# Patient Record
Sex: FEMALE | Race: WHITE | NOT HISPANIC OR LATINO
[De-identification: names, ages, dates, MRNs, and addresses within clinical notes are randomized per-mention and may not be internally consistent; named-entity substitution may affect disease eponyms.]

---
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
## 2024-03-28 ENCOUNTER — APPOINTMENT (RX ONLY)
Dept: URBAN - METROPOLITAN AREA CLINIC 151 | Facility: CLINIC | Age: 19
Setting detail: DERMATOLOGY
End: 2024-03-28

## 2024-03-28 ENCOUNTER — RX ONLY (OUTPATIENT)
Age: 19
Setting detail: RX ONLY
End: 2024-03-28

## 2024-03-28 DIAGNOSIS — L70.0 ACNE VULGARIS: ICD-10-CM

## 2024-03-28 PROCEDURE — ? MEDICATION COUNSELING

## 2024-03-28 PROCEDURE — 99214 OFFICE O/P EST MOD 30 MIN: CPT

## 2024-03-28 PROCEDURE — ? DIAGNOSIS COMMENT

## 2024-03-28 PROCEDURE — ? COUNSELING

## 2024-03-28 PROCEDURE — ? PRESCRIPTION

## 2024-03-28 PROCEDURE — ? PRESCRIPTION MEDICATION MANAGEMENT

## 2024-03-28 RX ORDER — SPIRONOLACTONE 100 MG/1
2 TABLETS TABLET, FILM COATED ORAL DAILY
Qty: 120 | Refills: 3 | Status: ERX

## 2024-03-28 RX ORDER — ADAPALENE 3 MG/G
GEL TOPICAL DAILY
Qty: 45 | Refills: 6 | Status: ERX | COMMUNITY
Start: 2024-03-28

## 2024-03-28 RX ADMIN — ADAPALENE: 3 GEL TOPICAL at 00:00

## 2024-03-28 NOTE — PROCEDURE: DIAGNOSIS COMMENT
Comment: Much improved, mild to moderate commedonal acne on spironolactone 200mg x3 months and no reported side effects. Rec using adapalene 0.3% for comedones around mouth. FU 6 months.
Render Risk Assessment In Note?: no
Detail Level: Simple

## 2024-03-28 NOTE — PROCEDURE: PRESCRIPTION MEDICATION MANAGEMENT
Modify Regimen: increase spironolactone 150 to spironolactone 200mg tablet QD
Detail Level: Zone
Initiate Treatment: Adapalene 0.03%
Render In Strict Bullet Format?: No

## 2024-03-28 NOTE — HPI: OTHER
Condition:: Acne
Please Describe Your Condition:: Pt states her acne is doing well on spironolactone 200mg, no recent breakouts. She notes some possibly lesions around her mouth. States the tretinoin 0.05% cream make her skin dry and irritated so she does not use it.

## 2024-05-28 NOTE — PROCEDURE: DIAGNOSIS COMMENT
From: Melody Clement  To: Bijal Jay  Sent: 5/28/2024 1:04 PM EDT  Subject: medication refills    Can you please send to [facility] refills    Metroprolol Tartrate 25 mg  Sertraline 100 mg  pin needles please   I'm leaving for vacaction in the morning ansd need to fill her week pill hoang up before I leave.  
Last OV: 05/17/2024 Return in 3 months (on 8/17/2024).     Next OV: None scheduled.   
Comment: Given improvement on spironolactone 125mg x3 months and no reported side effects, advised pt to increase to 150mg. Discussed plan to considering altering spironolactone dosage in future follow ups.\\nFU 3 months
Render Risk Assessment In Note?: no
Detail Level: Simple

## 2024-09-27 ENCOUNTER — APPOINTMENT (RX ONLY)
Dept: URBAN - METROPOLITAN AREA CLINIC 151 | Facility: CLINIC | Age: 19
Setting detail: DERMATOLOGY
End: 2024-09-27

## 2024-09-27 DIAGNOSIS — L70.0 ACNE VULGARIS: ICD-10-CM

## 2024-09-27 PROCEDURE — ? PRESCRIPTION

## 2024-09-27 PROCEDURE — 99214 OFFICE O/P EST MOD 30 MIN: CPT

## 2024-09-27 PROCEDURE — ? COUNSELING

## 2024-09-27 PROCEDURE — ? PRESCRIPTION MEDICATION MANAGEMENT

## 2024-09-27 PROCEDURE — ? MEDICATION COUNSELING

## 2024-09-27 PROCEDURE — ? DIAGNOSIS COMMENT

## 2024-09-27 RX ORDER — ADAPALENE 3 MG/G
GEL TOPICAL DAILY
Qty: 45 | Refills: 6 | Status: ACTIVE

## 2024-09-27 RX ORDER — SPIRONOLACTONE 100 MG/1
2 TABLETS TABLET, FILM COATED ORAL DAILY
Qty: 180 | Refills: 1 | Status: ERX

## 2024-09-27 RX ORDER — SPIRONOLACTONE 100 MG/1
2 TABLETS TABLET, FILM COATED ORAL DAILY
Qty: 120 | Refills: 3 | Status: ACTIVE

## 2024-09-27 NOTE — PROCEDURE: DIAGNOSIS COMMENT
Rachel is a 23 month old female who presents for cardiac consultation.    Rachel is accompanied by mother and father  Interpretation service was not necessary.    HISTORY OF PRESENT ILLNESS  Rachel is a 5-sjpqo-ajf female with Down syndrome and history of VSD and PDA.  Patient was seen by Dr. Demetrius Croft and was referred to us as she has left heart enlargement.  Both parents are pharmacist.    Patient does not have any labored breathing is gaining weight growing well.      REVIEW OF SYSTEMS  Review of Systems   Constitutional: Negative for activity change, appetite change, diaphoresis, fatigue and irritability.   HENT: Negative for congestion.    Eyes: Negative for visual disturbance.   Respiratory: Negative for wheezing and stridor.    Cardiovascular: Negative for chest pain, palpitations, leg swelling and cyanosis.   Gastrointestinal: Negative for abdominal distention, abdominal pain and vomiting.   Musculoskeletal: Negative for arthralgias, joint swelling and myalgias.   Skin: Negative for color change and pallor.   Neurological: Negative for syncope and weakness.        PROBLEM LIST  Patient Active Problem List   Diagnosis   • Trisomy 21   • Small perimembranous VSD (ventricular septal defect) with left-to-right shunt   • Obstructive sleep apnea   • Pulmonary hypertension   • Small PDA (patent ductus arteriosus) with left-to-right shunt       PAST MEDICAL, SURGICAL, FAMILY, AND SOCIAL HISTORY    Past Medical History:   Diagnosis Date   • Down syndrome    • PDA (patent ductus arteriosus)    • VSD (ventricular septal defect)        History reviewed. No pertinent surgical history.    Family History   Problem Relation Age of Onset   • Diabetes Paternal Aunt    • Diabetes Paternal Grandmother    • Heart disease Paternal Grandfather         Social History     Tobacco Use   • Smoking status: Never Smoker   • Smokeless tobacco: Never Used   Substance Use Topics   • Alcohol use: Not on file       No current 
outpatient medications on file.     No current facility-administered medications for this visit.       ALLERGIES:  No Known Allergies     PHYSICAL EXAM  Visit Vitals  BP (!) 117/68 (BP Location: RLE - Right lower extremity, Patient Position: Sitting, Cuff Size: Pediatric)   Pulse 129   Temp 98.5 °F (36.9 °C) (Temporal)   Ht 32.09\" (81.5 cm)   Wt 11 kg (24 lb 4 oz)   SpO2 97%   BMI 16.56 kg/m²       Physical Exam  Constitutional:       General: She is not in acute distress.     Appearance: She is not diaphoretic.   HENT:      Head: Normocephalic and atraumatic.   Eyes:      Pupils: Pupils are equal, round, and reactive to light.   Neck:      Thyroid: No thyromegaly.      Vascular: No JVD.      Trachea: No tracheal deviation.   Cardiovascular:      Rate and Rhythm: Normal rate and regular rhythm.      Pulses:           Radial pulses are 2+ on the right side and 2+ on the left side.        Femoral pulses are 2+ on the right side and 2+ on the left side.     Heart sounds: Murmur heard.   Systolic murmur is present with a grade of 2/6.     Pulmonary:      Effort: Pulmonary effort is normal. No respiratory distress.      Breath sounds: Normal breath sounds. No stridor. No wheezing or rales.   Chest:      Chest wall: No tenderness.   Abdominal:      General: Bowel sounds are normal. There is no distension.      Palpations: Abdomen is soft. There is no mass.      Tenderness: There is no abdominal tenderness. There is no guarding or rebound.   Musculoskeletal:         General: No tenderness. Normal range of motion.      Cervical back: Normal range of motion.   Skin:     General: Skin is warm and dry.      Coloration: Skin is not pale.      Findings: No erythema or rash.   Neurological:      Mental Status: She is alert.      Gait: Gait is intact.               ASSESSMENT AND PLAN  Problem List Items Addressed This Visit        Cardiac and Vasculature    Small perimembranous VSD (ventricular septal defect) with left-to-right 
shunt    Small PDA (patent ductus arteriosus) with left-to-right shunt - Primary     On today’s visit I took the opportunity to discuss risks and benefits with the family.  I showed them diagrams of normal anatomy versus patent ductus arteriosus.  I showed them how the PDA looks pre and post coil and plug occlusion.  They asked many appropriate questions.  They were able to hold coils and plugs in their hand.  They were sent home with a brochure from the [facility] describing the Amplatzer PDA plug.  This brochure also describes risks and benefits of the procedure.  Risks included but were not limited to vessel or cardiac perforation with bleeding requiring emergency surgery, air bubble or clot embolus resulting in stroke or myocardial infarction, coil embolization requiring surgical removal, hemolysis from high velocity jet around the coil, hemolysis requiring transfusion from high velocity jet around the coil, infection, femoral vessel damage requiring surgery and other risks.  I also discussed the need for long-term followup to make sure that the left pulmonary artery in particular grows normally after device implantation.                 ADDITIONAL CONSIDERATIONS  1.  May participate in all age-appropriate activities  2. SBE: According to the American Heart Association guidelines, SBE prophylaxis is not needed.  3. Surgery/Anesthesia: Based on the available history and data, the patient is at no greater cardiac risk than the general population for surgery, anesthesia, or sedation.  4. Immunizations: Routine immunizations should be provided, including influenza vaccination for patients of appropriate age.         FOLLOW UP  Return in about 3 months (around 11/4/2021).     
Comment: Much improved, mild to moderate commedonal acne on spironolactone 200mg x3 months and no reported side effects. Rec using adapalene 0.3% for comedones around mouth. FU 6 months.
Render Risk Assessment In Note?: no
Detail Level: Simple

## 2024-09-27 NOTE — PROCEDURE: DIAGNOSIS COMMENT
Comment: Much improved, mild to moderate commedonal acne on spironolactone 200mg w no reported side effects. Rec using adapalene 0.3% for comedones around mouth. \\n\\nRTC: 6 months (must be in person appt)
Render Risk Assessment In Note?: no
Detail Level: Simple

## 2024-09-27 NOTE — HPI: OTHER
Condition:: Acne
Please Describe Your Condition:: Pt improved on spironolactone and stable on 200mg. Pt denies any SEs.\\nPt has +Fhx of melanoma

## 2024-12-12 ENCOUNTER — RX ONLY (RX ONLY)
Age: 19
End: 2024-12-12

## 2024-12-12 RX ORDER — SPIRONOLACTONE 100 MG/1
2 TABLETS TABLET, FILM COATED ORAL DAILY
Qty: 180 | Refills: 1 | Status: ERX

## 2025-04-16 ENCOUNTER — APPOINTMENT (OUTPATIENT)
Dept: URBAN - METROPOLITAN AREA CLINIC 151 | Facility: CLINIC | Age: 20
Setting detail: DERMATOLOGY
End: 2025-04-16

## 2025-04-16 DIAGNOSIS — L70.0 ACNE VULGARIS: ICD-10-CM | Status: INADEQUATELY CONTROLLED

## 2025-04-16 PROCEDURE — 99214 OFFICE O/P EST MOD 30 MIN: CPT

## 2025-04-16 PROCEDURE — ? DIAGNOSIS COMMENT

## 2025-04-16 PROCEDURE — ? PRESCRIPTION

## 2025-04-16 PROCEDURE — ? COUNSELING

## 2025-04-16 PROCEDURE — ? PRESCRIPTION MEDICATION MANAGEMENT

## 2025-04-16 RX ORDER — SPIRONOLACTONE 100 MG/1
TABLET, FILM COATED ORAL
Qty: 60 | Refills: 2 | Status: ERX

## 2025-04-16 RX ORDER — NIACINAMIDE 4% / SPIRONOLACTONE 5% 4; 5 G/100G; G/100G
GEL TOPICAL
Qty: 30 | Refills: 2 | Status: ERX | COMMUNITY
Start: 2025-04-16

## 2025-04-16 RX ADMIN — NIACINAMIDE 4% / SPIRONOLACTONE 5%: 4; 5 GEL TOPICAL at 00:00

## 2025-04-16 NOTE — PROCEDURE: DIAGNOSIS COMMENT
Render Risk Assessment In Note?: no
Detail Level: Simple
Comment: pt still having some IP's intermittently but acne overall improved - disc not increasing spironolactone 100mg BID - can add 5% spironolactone BID and see if it decreases current breakouts-  Per mother Adapalene 0.03% irritating/drying out face but feels pt applying more then pea size amount - .disc assisting pt apply medicine and mix with moisturizer if still to drying switch adapalene 0.1 \\nf/u 4 months or sooner with Lazara Vieira

## 2025-04-16 NOTE — PROCEDURE: PRESCRIPTION MEDICATION MANAGEMENT
Detail Level: Zone
Initiate Treatment: spironolactone 5 %-niacinamide 4 % topical gel \\nQuantity: 30.0 g  Days Supply: 30\\nSig: Apply to face twice daily.
Continue Regimen: spironolactone 100 mg tablet Daily\\nQuantity: 180.0 Tablet  Days Supply: 90\\nSig: Take 2 tablets orally daily. Additional refills require an in person appointment in 6 months.
Render In Strict Bullet Format?: No

## 2025-04-16 NOTE — PROCEDURE: COUNSELING
Winlevi Pregnancy And Lactation Text: This medication is considered safe during pregnancy and breastfeeding.
Tetracycline Pregnancy And Lactation Text: This medication is Pregnancy Category D and not consider safe during pregnancy. It is also excreted in breast milk.
Azithromycin Counseling:  I discussed with the patient the risks of azithromycin including but not limited to GI upset, allergic reaction, drug rash, diarrhea, and yeast infections.
Dapsone Pregnancy And Lactation Text: This medication is Pregnancy Category C and is not considered safe during pregnancy or breast feeding.
Use Enhanced Medication Counseling?: No
Tazorac Counseling:  Patient advised that medication is irritating and drying.  Patient may need to apply sparingly and wash off after an hour before eventually leaving it on overnight.  The patient verbalized understanding of the proper use and possible adverse effects of tazorac.  All of the patient's questions and concerns were addressed.
Aklief Pregnancy And Lactation Text: It is unknown if this medication is safe to use during pregnancy.  It is unknown if this medication is excreted in breast milk.  Breastfeeding women should use the topical cream on the smallest area of the skin for the shortest time needed while breastfeeding.  Do not apply to nipple and areola.
Sarecycline Counseling: Patient advised regarding possible photosensitivity and discoloration of the teeth, skin, lips, tongue and gums.  Patient instructed to avoid sunlight, if possible.  When exposed to sunlight, patients should wear protective clothing, sunglasses, and sunscreen.  The patient was instructed to call the office immediately if the following severe adverse effects occur:  hearing changes, easy bruising/bleeding, severe headache, or vision changes.  The patient verbalized understanding of the proper use and possible adverse effects of sarecycline.  All of the patient's questions and concerns were addressed.
Minocycline Counseling: Patient advised regarding possible photosensitivity and discoloration of the teeth, skin, lips, tongue and gums.  Patient instructed to avoid sunlight, if possible.  When exposed to sunlight, patients should wear protective clothing, sunglasses, and sunscreen.  The patient was instructed to call the office immediately if the following severe adverse effects occur:  hearing changes, easy bruising/bleeding, severe headache, or vision changes.  The patient verbalized understanding of the proper use and possible adverse effects of minocycline.  All of the patient's questions and concerns were addressed.
Doxycycline Pregnancy And Lactation Text: This medication is Pregnancy Category D and not consider safe during pregnancy. It is also excreted in breast milk but is considered safe for shorter treatment courses.
Topical Retinoid counseling:  Patient advised to apply a pea-sized amount only at bedtime and wait 30 minutes after washing their face before applying.  If too drying, patient may add a non-comedogenic moisturizer. The patient verbalized understanding of the proper use and possible adverse effects of retinoids.  All of the patient's questions and concerns were addressed.
Topical Sulfur Applications Pregnancy And Lactation Text: This medication is Pregnancy Category C and has an unknown safety profile during pregnancy. It is unknown if this topical medication is excreted in breast milk.
Benzoyl Peroxide Counseling: Patient counseled that medicine may cause skin irritation and bleach clothing.  In the event of skin irritation, the patient was advised to reduce the amount of the drug applied or use it less frequently.   The patient verbalized understanding of the proper use and possible adverse effects of benzoyl peroxide.  All of the patient's questions and concerns were addressed.
Topical Clindamycin Pregnancy And Lactation Text: This medication is Pregnancy Category B and is considered safe during pregnancy. It is unknown if it is excreted in breast milk.
High Dose Vitamin A Counseling: Side effects reviewed, pt to contact office should one occur.
Spironolactone Pregnancy And Lactation Text: This medication can cause feminization of the male fetus and should be avoided during pregnancy. The active metabolite is also found in breast milk.
Isotretinoin Counseling: Patient should get monthly blood tests, not donate blood, not drive at night if vision affected, not share medication, and not undergo elective surgery for 6 months after tx completed. Side effects reviewed, pt to contact office should one occur.
Tazorac Pregnancy And Lactation Text: This medication is not safe during pregnancy. It is unknown if this medication is excreted in breast milk.
Detail Level: Zone
Birth Control Pills Pregnancy And Lactation Text: This medication should be avoided if pregnant and for the first 30 days post-partum.
Azelaic Acid Counseling: Patient counseled that medicine may cause skin irritation and to avoid applying near the eyes.  In the event of skin irritation, the patient was advised to reduce the amount of the drug applied or use it less frequently.   The patient verbalized understanding of the proper use and possible adverse effects of azelaic acid.  All of the patient's questions and concerns were addressed.
Doxycycline Counseling:  Patient counseled regarding possible photosensitivity and increased risk for sunburn.  Patient instructed to avoid sunlight, if possible.  When exposed to sunlight, patients should wear protective clothing, sunglasses, and sunscreen.  The patient was instructed to call the office immediately if the following severe adverse effects occur:  hearing changes, easy bruising/bleeding, severe headache, or vision changes.  The patient verbalized understanding of the proper use and possible adverse effects of doxycycline.  All of the patient's questions and concerns were addressed.
High Dose Vitamin A Pregnancy And Lactation Text: High dose vitamin A therapy is contraindicated during pregnancy and breast feeding.
Topical Retinoid Pregnancy And Lactation Text: This medication is Pregnancy Category C. It is unknown if this medication is excreted in breast milk.
Tetracycline Counseling: Patient counseled regarding possible photosensitivity and increased risk for sunburn.  Patient instructed to avoid sunlight, if possible.  When exposed to sunlight, patients should wear protective clothing, sunglasses, and sunscreen.  The patient was instructed to call the office immediately if the following severe adverse effects occur:  hearing changes, easy bruising/bleeding, severe headache, or vision changes.  The patient verbalized understanding of the proper use and possible adverse effects of tetracycline.  All of the patient's questions and concerns were addressed. Patient understands to avoid pregnancy while on therapy due to potential birth defects.
Winlevi Counseling:  I discussed with the patient the risks of topical clascoterone including but not limited to erythema, scaling, itching, and stinging. Patient voiced their understanding.
Erythromycin Counseling:  I discussed with the patient the risks of erythromycin including but not limited to GI upset, allergic reaction, drug rash, diarrhea, increase in liver enzymes, and yeast infections.
Bactrim Pregnancy And Lactation Text: This medication is Pregnancy Category D and is known to cause fetal risk.  It is also excreted in breast milk.
Azithromycin Pregnancy And Lactation Text: This medication is considered safe during pregnancy and is also secreted in breast milk.
Aklief counseling:  Patient advised to apply a pea-sized amount only at bedtime and wait 30 minutes after washing their face before applying.  If too drying, patient may add a non-comedogenic moisturizer.  The most commonly reported side effects including irritation, redness, scaling, dryness, stinging, burning, itching, and increased risk of sunburn.  The patient verbalized understanding of the proper use and possible adverse effects of retinoids.  All of the patient's questions and concerns were addressed.
Birth Control Pills Counseling: Birth Control Pill Counseling: I discussed with the patient the potential side effects of OCPs including but not limited to increased risk of stroke, heart attack, thrombophlebitis, deep venous thrombosis, hepatic adenomas, breast changes, GI upset, headaches, and depression.  The patient verbalized understanding of the proper use and possible adverse effects of OCPs. All of the patient's questions and concerns were addressed.
Azelaic Acid Pregnancy And Lactation Text: This medication is considered safe during pregnancy and breast feeding.
Erythromycin Pregnancy And Lactation Text: This medication is Pregnancy Category B and is considered safe during pregnancy. It is also excreted in breast milk.
Benzoyl Peroxide Pregnancy And Lactation Text: This medication is Pregnancy Category C. It is unknown if benzoyl peroxide is excreted in breast milk.
Topical Sulfur Applications Counseling: Topical Sulfur Counseling: Patient counseled that this medication may cause skin irritation or allergic reactions.  In the event of skin irritation, the patient was advised to reduce the amount of the drug applied or use it less frequently.   The patient verbalized understanding of the proper use and possible adverse effects of topical sulfur application.  All of the patient's questions and concerns were addressed.
Bactrim Counseling:  I discussed with the patient the risks of sulfa antibiotics including but not limited to GI upset, allergic reaction, drug rash, diarrhea, dizziness, photosensitivity, and yeast infections.  Rarely, more serious reactions can occur including but not limited to aplastic anemia, agranulocytosis, methemoglobinemia, blood dyscrasias, liver or kidney failure, lung infiltrates or desquamative/blistering drug rashes.
Dapsone Counseling: I discussed with the patient the risks of dapsone including but not limited to hemolytic anemia, agranulocytosis, rashes, methemoglobinemia, kidney failure, peripheral neuropathy, headaches, GI upset, and liver toxicity.  Patients who start dapsone require monitoring including baseline LFTs and weekly CBCs for the first month, then every month thereafter.  The patient verbalized understanding of the proper use and possible adverse effects of dapsone.  All of the patient's questions and concerns were addressed.
Spironolactone Counseling: Patient advised regarding risks of diarrhea, abdominal pain, hyperkalemia, birth defects (for female patients), liver toxicity and renal toxicity. The patient may need blood work to monitor liver and kidney function and potassium levels while on therapy. The patient verbalized understanding of the proper use and possible adverse effects of spironolactone.  All of the patient's questions and concerns were addressed.
Topical Clindamycin Counseling: Patient counseled that this medication may cause skin irritation or allergic reactions.  In the event of skin irritation, the patient was advised to reduce the amount of the drug applied or use it less frequently.   The patient verbalized understanding of the proper use and possible adverse effects of clindamycin.  All of the patient's questions and concerns were addressed.
Isotretinoin Pregnancy And Lactation Text: This medication is Pregnancy Category X and is considered extremely dangerous during pregnancy. It is unknown if it is excreted in breast milk.

## 2025-04-17 ENCOUNTER — RX ONLY (RX ONLY)
Age: 20
End: 2025-04-17

## 2025-04-17 RX ORDER — CLASCOTERONE 1 G/100G
CREAM TOPICAL
Qty: 60 | Refills: 3 | Status: ERX | COMMUNITY
Start: 2025-04-17

## 2025-08-04 ENCOUNTER — APPOINTMENT (OUTPATIENT)
Dept: URBAN - METROPOLITAN AREA CLINIC 151 | Facility: CLINIC | Age: 20
Setting detail: DERMATOLOGY
End: 2025-08-04

## 2025-08-04 DIAGNOSIS — L70.0 ACNE VULGARIS: ICD-10-CM | Status: STABLE

## 2025-08-04 PROCEDURE — ? COUNSELING

## 2025-08-04 PROCEDURE — ? DIAGNOSIS COMMENT

## 2025-08-04 PROCEDURE — ? PRESCRIPTION

## 2025-08-04 PROCEDURE — ? PRESCRIPTION MEDICATION MANAGEMENT

## 2025-08-04 PROCEDURE — ? GENTLE SKIN CARE INSTRUCTIONS

## 2025-08-04 RX ORDER — SPIRONOLACTONE 100 MG/1
TABLET, FILM COATED ORAL
Qty: 90 | Refills: 1 | Status: ERX

## 2025-08-04 ASSESSMENT — SEVERITY ASSESSMENT OVERALL AMONG ALL PATIENTS
IN YOUR EXPERIENCE, AMONG ALL PATIENTS YOU HAVE SEEN WITH THIS CONDITION, HOW SEVERE IS THIS PATIENT'S CONDITION?: ALMOST CLEAR